# Patient Record
Sex: FEMALE | Race: WHITE | NOT HISPANIC OR LATINO
[De-identification: names, ages, dates, MRNs, and addresses within clinical notes are randomized per-mention and may not be internally consistent; named-entity substitution may affect disease eponyms.]

---

## 2022-08-29 ENCOUNTER — APPOINTMENT (OUTPATIENT)
Dept: ORTHOPEDIC SURGERY | Facility: CLINIC | Age: 57
End: 2022-08-29

## 2022-08-29 VITALS — HEIGHT: 63 IN | WEIGHT: 165 LBS | BODY MASS INDEX: 29.23 KG/M2

## 2022-08-29 DIAGNOSIS — Z98.890 OTHER SPECIFIED POSTPROCEDURAL STATES: ICD-10-CM

## 2022-08-29 PROCEDURE — 99213 OFFICE O/P EST LOW 20 MIN: CPT

## 2022-08-29 NOTE — HISTORY OF PRESENT ILLNESS
[7] : 7 [0] : 0 [Burning] : burning [Dull/Aching] : dull/aching [Localized] : localized [Sharp] : sharp [Intermittent] : intermittent [Leisure] : leisure [Meds] : meds [Standing] : standing [Walking] : walking [de-identified] : Patient returns for her LT ankle, s/p peroneal brevis repair with brevis/longus tenosynovectomy 7/13/21. WB in supportive sneakers. Ice to affected area. She was not able to do as much PT as she would like as she had to take care of her mother and was also dealing with COVID. WB without assistive device today. She reports still experiencing intermittent symptoms especially at end of day. NSAIDS prn are helpful.  [] : Post Surgical Visit: no [FreeTextEntry1] : L ankle [FreeTextEntry5] : Patient is here today for New Injury of left ankle. Patient had an ankle tendon repair done by Nila in July 2021. Patient has been in ongoing pain since surgery. Patient had a bump on outside of ankle since surgery and has never gone down. Bump can travel to the top of her foot sometimes  [de-identified] : july 2021 [de-identified] : early 2022

## 2022-08-29 NOTE — ASSESSMENT
[FreeTextEntry1] : WBAT in supportive footwear.\par Ice to affected area.\par OTC NSAIDS vs. voltaren gel prn.

## 2022-08-29 NOTE — PHYSICAL EXAM
[Left] : left foot and ankle [NL (40)] : plantar flexion 40 degrees [NL 30)] : inversion 30 degrees [NL (20)] : eversion 20 degrees [5___] : [facility] 5[unfilled]/5 [2+] : posterior tibialis pulse: 2+ [Normal] : saphenous nerve sensation normal [] : non-antalgic [FreeTextEntry3] : Adipose tissue within sinus tarsi bilaterally.  [FreeTextEntry9] : ROM is symmetrical.  [TWNoteComboBox7] : dorsiflexion 5 degrees

## 2023-03-21 ENCOUNTER — APPOINTMENT (OUTPATIENT)
Dept: CARDIOLOGY | Facility: CLINIC | Age: 58
End: 2023-03-21
Payer: COMMERCIAL

## 2023-03-21 ENCOUNTER — NON-APPOINTMENT (OUTPATIENT)
Age: 58
End: 2023-03-21

## 2023-03-21 VITALS
SYSTOLIC BLOOD PRESSURE: 134 MMHG | BODY MASS INDEX: 30.12 KG/M2 | DIASTOLIC BLOOD PRESSURE: 79 MMHG | WEIGHT: 170 LBS | HEIGHT: 63 IN | OXYGEN SATURATION: 95 % | HEART RATE: 85 BPM

## 2023-03-21 DIAGNOSIS — R00.2 PALPITATIONS: ICD-10-CM

## 2023-03-21 PROCEDURE — 99204 OFFICE O/P NEW MOD 45 MIN: CPT | Mod: 25

## 2023-03-21 PROCEDURE — 93000 ELECTROCARDIOGRAM COMPLETE: CPT

## 2023-03-21 NOTE — REASON FOR VISIT
[Symptom and Test Evaluation] : symptom and test evaluation [FreeTextEntry1] : Pt's first visit in 8 years.\par \par It's been almost a year since her mother's passing, she continues to struggle with the loss but seems to be getting better. She is relatively estranged from her ex , her brother and sister. Her son is planning on moving to pursue his nursing career. \par \par Despite the overall stress, she's been doing relatively well but has been experiencing palpitations, particularly at night. They are mild to moderate in intensity, are not associated with CP, and resolve spontaneously. They are a "fluttering" in nature. \par \par 1. Check 2D echo and EST.\par 2. We have reviewed current AHA exercise guidelines, including 30 minutes 5 days per week of moderate level aerobic activity and 20 minutes twice per week of strength training.\par

## 2023-03-21 NOTE — ASSESSMENT
[FreeTextEntry1] : 1. Check 2D echo and EST.\par 2. We have reviewed current AHA exercise guidelines, including 30 minutes 5 days per week of moderate level aerobic activity and 20 minutes twice per week of strength training.

## 2023-06-13 ENCOUNTER — APPOINTMENT (OUTPATIENT)
Dept: ORTHOPEDIC SURGERY | Facility: CLINIC | Age: 58
End: 2023-06-13
Payer: COMMERCIAL

## 2023-06-13 VITALS — HEIGHT: 63 IN | BODY MASS INDEX: 30.12 KG/M2 | WEIGHT: 170 LBS

## 2023-06-13 PROCEDURE — 99213 OFFICE O/P EST LOW 20 MIN: CPT | Mod: 25

## 2023-06-13 PROCEDURE — 20612 ASPIRATE/INJ GANGLION CYST: CPT | Mod: LT

## 2023-06-13 NOTE — ASSESSMENT
[FreeTextEntry1] : I aspirated the cyst under aseptic conditions\par Patient tolerated it well\par They understand 50/50 recurrence\par Return prn\par

## 2023-06-13 NOTE — HISTORY OF PRESENT ILLNESS
[6] : 6 [5] : 5 [Dull/Aching] : dull/aching [Ice] : ice [de-identified] : L wrist volar ganglion cyst \par Very painful [] : no [FreeTextEntry1] : L wrist [FreeTextEntry3] : few weeks ago [FreeTextEntry5] : bump came back. having pain

## 2023-06-27 ENCOUNTER — OUTPATIENT (OUTPATIENT)
Dept: OUTPATIENT SERVICES | Facility: HOSPITAL | Age: 58
LOS: 1 days | End: 2023-06-27

## 2023-06-27 ENCOUNTER — TRANSCRIPTION ENCOUNTER (OUTPATIENT)
Age: 58
End: 2023-06-27

## 2023-06-27 ENCOUNTER — APPOINTMENT (OUTPATIENT)
Dept: CV DIAGNOSTICS | Facility: HOSPITAL | Age: 58
End: 2023-06-27

## 2023-06-27 ENCOUNTER — APPOINTMENT (OUTPATIENT)
Dept: CV DIAGNOSITCS | Facility: HOSPITAL | Age: 58
End: 2023-06-27

## 2023-06-27 DIAGNOSIS — R00.2 PALPITATIONS: ICD-10-CM

## 2023-11-27 RX ORDER — HYDROCODONE BITARTRATE AND ACETAMINOPHEN 5; 325 MG/1; MG/1
5-325 TABLET ORAL
Qty: 20 | Refills: 0 | Status: ACTIVE | COMMUNITY
Start: 2023-11-27 | End: 1900-01-01

## 2023-11-29 ENCOUNTER — APPOINTMENT (OUTPATIENT)
Age: 58
End: 2023-11-29
Payer: COMMERCIAL

## 2023-11-29 PROCEDURE — 25111 REMOVE WRIST TENDON LESION: CPT | Mod: AS,LT

## 2023-11-29 PROCEDURE — 25111 REMOVE WRIST TENDON LESION: CPT | Mod: LT

## 2023-12-05 ENCOUNTER — APPOINTMENT (OUTPATIENT)
Dept: ORTHOPEDIC SURGERY | Facility: CLINIC | Age: 58
End: 2023-12-05
Payer: COMMERCIAL

## 2023-12-05 VITALS — WEIGHT: 170 LBS | HEIGHT: 63 IN | BODY MASS INDEX: 30.12 KG/M2

## 2023-12-05 DIAGNOSIS — M67.432 GANGLION, LEFT WRIST: ICD-10-CM

## 2023-12-05 PROCEDURE — 99024 POSTOP FOLLOW-UP VISIT: CPT

## 2024-09-10 ENCOUNTER — APPOINTMENT (OUTPATIENT)
Dept: ORTHOPEDIC SURGERY | Facility: CLINIC | Age: 59
End: 2024-09-10
Payer: COMMERCIAL

## 2024-09-10 VITALS — BODY MASS INDEX: 30.12 KG/M2 | HEIGHT: 63 IN | WEIGHT: 170 LBS

## 2024-09-10 DIAGNOSIS — M19.041 PRIMARY OSTEOARTHRITIS, RIGHT HAND: ICD-10-CM

## 2024-09-10 PROCEDURE — 99213 OFFICE O/P EST LOW 20 MIN: CPT

## 2024-09-10 RX ORDER — METHYLPREDNISOLONE 4 MG/1
4 TABLET ORAL
Qty: 1 | Refills: 0 | Status: ACTIVE | COMMUNITY
Start: 2024-09-10 | End: 1900-01-01

## 2024-09-10 NOTE — ASSESSMENT
[FreeTextEntry1] : The patient was advised of the diagnosis. The natural history of the pathology was explained in full to the patient in layman's terms. All questions were answered. The risks and benefits of surgical and non-surgical treatment alternatives were explained in full to the patient.  Discussed injection for pain - patient declines at this time  Rx MDP Activity modification as tolerated Ice as needed NSAIDs as needed after finishing MDP Return prn

## 2024-09-10 NOTE — HISTORY OF PRESENT ILLNESS
[de-identified] : R SF pain and swelling No injury  [6] : 6 [Dull/Aching] : dull/aching [Nothing helps with pain getting better] : Nothing helps with pain getting better [] : yes [FreeTextEntry1] : SHRADDHA GRAMAJO [FreeTextEntry5] : she has pain and a bump [de-identified] : activity

## 2024-12-23 ENCOUNTER — EMERGENCY (EMERGENCY)
Facility: HOSPITAL | Age: 59
LOS: 1 days | Discharge: HOME | End: 2024-12-23
Attending: STUDENT IN AN ORGANIZED HEALTH CARE EDUCATION/TRAINING PROGRAM | Admitting: STUDENT IN AN ORGANIZED HEALTH CARE EDUCATION/TRAINING PROGRAM
Payer: SELF-PAY

## 2024-12-23 VITALS
RESPIRATION RATE: 16 BRPM | SYSTOLIC BLOOD PRESSURE: 159 MMHG | TEMPERATURE: 98 F | HEART RATE: 84 BPM | HEIGHT: 62 IN | OXYGEN SATURATION: 98 % | DIASTOLIC BLOOD PRESSURE: 97 MMHG | WEIGHT: 169.98 LBS

## 2024-12-23 PROCEDURE — 70450 CT HEAD/BRAIN W/O DYE: CPT | Mod: MC

## 2024-12-23 PROCEDURE — 99284 EMERGENCY DEPT VISIT MOD MDM: CPT | Mod: 25

## 2024-12-23 PROCEDURE — 72125 CT NECK SPINE W/O DYE: CPT | Mod: 26,MC

## 2024-12-23 PROCEDURE — 72125 CT NECK SPINE W/O DYE: CPT | Mod: MC

## 2024-12-23 PROCEDURE — 99284 EMERGENCY DEPT VISIT MOD MDM: CPT

## 2024-12-23 PROCEDURE — 70450 CT HEAD/BRAIN W/O DYE: CPT | Mod: 26,MC

## 2024-12-23 RX ORDER — IBUPROFEN 200 MG
600 TABLET ORAL ONCE
Refills: 0 | Status: COMPLETED | OUTPATIENT
Start: 2024-12-23 | End: 2024-12-23

## 2024-12-23 RX ORDER — LIDOCAINE 40 MG/G
1 CREAM TOPICAL ONCE
Refills: 0 | Status: COMPLETED | OUTPATIENT
Start: 2024-12-23 | End: 2024-12-23

## 2024-12-23 RX ADMIN — Medication 600 MILLIGRAM(S): at 16:24

## 2024-12-23 RX ADMIN — Medication 600 MILLIGRAM(S): at 17:13

## 2024-12-23 RX ADMIN — LIDOCAINE 1 PATCH: 40 CREAM TOPICAL at 16:26

## 2024-12-23 NOTE — ED PROVIDER NOTE - PATIENT PORTAL LINK FT
You can access the FollowMyHealth Patient Portal offered by [facility] by registering at the following website: [email or web address]. By joining [employer]’s FollowMyHealth portal, you will also be able to view your health information using other applications (apps) compatible with our system.

## 2024-12-23 NOTE — ED PROVIDER NOTE - MUSCULOSKELETAL, MLM
Spine appears normal, range of motion is not limited, no vert tenderness. right sided upper trap soft tissue tenderness. full ROM. no pain with axial loading

## 2024-12-23 NOTE — ED PROVIDER NOTE - NEURO NEGATIVE STATEMENT, MLM
no loss of consciousness, no gait abnormality, no headache, no dizziness or confusion Standing/Walking/Toileting/Moving from bed to stretcher

## 2024-12-23 NOTE — ED PROVIDER NOTE - CLINICAL SUMMARY MEDICAL DECISION MAKING FREE TEXT BOX
patient is a 59YOF with no sign PMH who presents to the ED for fall at work. patient hit head on cabinet and is compalining of HA and neck soreness. denies LOC, chest pain, sob, n/v, numbness or weakness.    General: well appearing, no acute distress, appropriate weight  HENT:  Head: normocephalic  Ears: canal clear, TM intact with good light reflex  Eyes: EOMI, PERRLA, no nystagmus  Nose: atraumatic  Oropharynx: mucosa pink, moist, no lesions, uvula midline.  Neck: no lymphadenopathy    Cardiac: heart RRR, no murmurs, rubs, gallops, pulses 2+ x4  Pulm: lungs CTA  GI: abdomen soft, nontender  Neuro: A&Ox3, CN2-12 intact, sensation intact, strength 5/5, no ataxia  Skin: warm, dry, no rash, laceration, abrasion, 3cm contusion to the R forehead.    will obtain CT and medicate for pain.

## 2024-12-23 NOTE — ED PROVIDER NOTE - NSFOLLOWUPINSTRUCTIONS_ED_ALL_ED_FT
Tylenol or Motrin over-the-counter for pain discomfort  Salonpas lidocaine patches over-the-counter, 12 hours on 12 hours off  Follow-up with primary care doctor      Head Injury    WHAT YOU NEED TO KNOW:    What do I need to know about a head injury? A head injury can include your scalp, face, skull, or brain and range from mild to severe. Effects can appear immediately after the injury or develop later. The effects may last a short time or be permanent. Healthcare providers may want to check your recovery over time. Treatment may change as you recover or develop new health problems from the head injury.    What are the signs and symptoms of a head injury?    An open scalp or skin wound, swelling, or bruising    Mild to moderate headache    Dizziness or loss of balance    Nausea or vomiting    Ringing in the ears or neck pain    Confusion, especially right after the injury    Change in mood, such as feeling restless or irritable    Trouble thinking, remembering, or concentrating    Drowsiness or decreased amount of energy    Trouble sleeping  How is a head injury diagnosed?    Tell your healthcare provider about your injury and symptoms. Your provider may check how your pupils react to light. Your brain function, memory, hand grasp, and balance may also be checked.    You may need x-rays, a CT scan, or an MRI to check for bleeding or major damage to your skull or brain. You may be given contrast liquid to help the pictures show up better. Tell the healthcare provider if you have ever had an allergic reaction to contrast liquid. Do not enter the MRI room with anything metal. Metal can cause serious injury. Tell the provider if you have any metal in or on your body.  How is a head injury treated? A mild head injury may not need to be treated. You may be given medicine to decrease pain. A concussion, hematoma (collection of blood), or traumatic brain injury may need both immediate and long-term treatment.    How can I manage my symptoms?    Rest or do quiet activities. Limit your time watching TV, using the computer, or doing tasks that require a lot of thinking. Slowly return to your normal activities as directed. Do not play sports or do activities that may cause you to get hit in the head. Ask your healthcare provider when you can return to sports.    Apply ice on your head for 15 to 20 minutes every hour or as directed. Use an ice pack, or put crushed ice in a plastic bag. Cover it with a towel before you apply it. Ice helps decrease swelling and pain.    Have someone stay with you for 24 hours , or as directed. This person can monitor you for problems and call for help if needed. When you are awake, the person should ask you a few questions every few hours to see if you are thinking clearly. An example is to ask your name or address.  What can I do to prevent another head injury?    Wear a helmet that fits properly. Do this when you play sports, or ride a bike, scooter, or skateboard. Helmets help decrease your risk for a serious head injury. Talk to your healthcare provider about other ways you can protect yourself if you play sports.    Wear your seat belt every time you are in a car. This helps lower your risk for a head injury if you are in a car accident.  Call your local emergency number (911 in the ) or have someone call if:    You cannot be woken.    You have a seizure.    You stop responding to others or you faint.    You have blurry or double vision.    Your speech becomes slurred or confused.    You have arm or leg weakness, loss of feeling, or new problems with coordination.    Your pupils are larger than usual, or one pupil is a different size than the other.    You have blood or clear fluid coming out of your ears or nose.  When should I seek immediate care?    You have repeated or forceful vomiting.    You feel confused.    Your headache gets worse or becomes severe.    You or someone caring for you notices that you are harder to wake than usual.  When should I call my doctor?    Your symptoms last longer than 6 weeks after the injury.    You have questions or concerns about your condition or care.  CARE AGREEMENT:    You have the right to help plan your care. Learn about your health condition and how it may be treated. Discuss treatment options with your healthcare providers to decide what care you want to receive. You always have the right to refuse treatment.

## 2024-12-23 NOTE — ED ADULT NURSE NOTE - OBJECTIVE STATEMENT
Patient received complaining of fall with neck pain, denies LoC, denies use of blood thinners, changes in vision, nausea vomiting, CP, SOB. Patient is AOx4, safety precautions in place, awaiting evaluation

## 2024-12-23 NOTE — ED PROVIDER NOTE - DIFFERENTIAL DIAGNOSIS
Differential Diagnosis Differentials include but not limited to contusion, concussion, ICH, fracture, cervical strain